# Patient Record
Sex: MALE
[De-identification: names, ages, dates, MRNs, and addresses within clinical notes are randomized per-mention and may not be internally consistent; named-entity substitution may affect disease eponyms.]

---

## 2021-09-29 PROBLEM — Z00.00 ENCOUNTER FOR PREVENTIVE HEALTH EXAMINATION: Status: ACTIVE | Noted: 2021-09-29

## 2021-09-30 ENCOUNTER — APPOINTMENT (OUTPATIENT)
Dept: OTOLARYNGOLOGY | Facility: CLINIC | Age: 66
End: 2021-09-30
Payer: COMMERCIAL

## 2021-09-30 VITALS — BODY MASS INDEX: 27.2 KG/M2 | WEIGHT: 190 LBS | TEMPERATURE: 97 F | HEIGHT: 70 IN

## 2021-09-30 DIAGNOSIS — H90.3 SENSORINEURAL HEARING LOSS, BILATERAL: ICD-10-CM

## 2021-09-30 DIAGNOSIS — H61.23 IMPACTED CERUMEN, BILATERAL: ICD-10-CM

## 2021-09-30 DIAGNOSIS — Z78.9 OTHER SPECIFIED HEALTH STATUS: ICD-10-CM

## 2021-09-30 DIAGNOSIS — Z83.3 FAMILY HISTORY OF DIABETES MELLITUS: ICD-10-CM

## 2021-09-30 DIAGNOSIS — Z87.898 PERSONAL HISTORY OF OTHER SPECIFIED CONDITIONS: ICD-10-CM

## 2021-09-30 DIAGNOSIS — Z80.41 FAMILY HISTORY OF MALIGNANT NEOPLASM OF OVARY: ICD-10-CM

## 2021-09-30 DIAGNOSIS — G35 MULTIPLE SCLEROSIS: ICD-10-CM

## 2021-09-30 PROCEDURE — G0268 REMOVAL OF IMPACTED WAX MD: CPT

## 2021-09-30 PROCEDURE — 92567 TYMPANOMETRY: CPT

## 2021-09-30 PROCEDURE — 99202 OFFICE O/P NEW SF 15 MIN: CPT | Mod: 25

## 2021-09-30 PROCEDURE — 92557 COMPREHENSIVE HEARING TEST: CPT

## 2021-09-30 RX ORDER — ATORVASTATIN CALCIUM 80 MG/1
TABLET, FILM COATED ORAL
Refills: 0 | Status: ACTIVE | COMMUNITY

## 2021-09-30 RX ORDER — GLATIRAMER 40 MG/ML
40 INJECTION, SOLUTION SUBCUTANEOUS
Refills: 0 | Status: ACTIVE | COMMUNITY

## 2021-09-30 NOTE — HISTORY OF PRESENT ILLNESS
[de-identified] : KALYAN LOPEZ is a 65 year patient Patient referred by Dr. Johnson for hearing loss and cerumen impaction. He has had fullness in the ears which is worse on the left side. He denies otalgia, otorrhea, tinnitus, or dizziness. He tried over-the-counter wax removal drops which helped a little bit. He has no history of recurrent ear infections, prior otologic surgery, ear trauma, or excessive noise exposure.

## 2021-09-30 NOTE — CONSULT LETTER
[Dear  ___] : Dear  [unfilled], [Consult Letter:] : I had the pleasure of evaluating your patient, [unfilled]. [Please see my note below.] : Please see my note below. [Consult Closing:] : Thank you very much for allowing me to participate in the care of this patient.  If you have any questions, please do not hesitate to contact me. [Sincerely,] : Sincerely, [FreeTextEntry3] : Paulina Mae MD\par

## 2021-09-30 NOTE — ASSESSMENT
[FreeTextEntry1] : Cerumen impaction was removed and the ears. He felt better afterwards. Audiogram shows bilateral high frequency sensorineural hearing loss\par \par PLAN\par \par -findings and management options discussed in detail with the patient. \par -good aural hygiene\par -avoid using cotton swabs in the ears\par -wax removal drops as needed. \par -noise precautions\par -annual audiogram\par -follow up in one year\par -call and return earlier if any concerns. \par

## 2021-10-01 PROBLEM — H90.3 SENSORINEURAL HEARING LOSS (SNHL) OF BOTH EARS: Status: ACTIVE | Noted: 2021-09-30
